# Patient Record
Sex: MALE | Employment: OTHER | URBAN - METROPOLITAN AREA
[De-identification: names, ages, dates, MRNs, and addresses within clinical notes are randomized per-mention and may not be internally consistent; named-entity substitution may affect disease eponyms.]

---

## 2020-06-30 ENCOUNTER — OFFICE VISIT (OUTPATIENT)
Dept: PRIMARY CARE CLINIC | Age: 70
End: 2020-06-30

## 2020-06-30 VITALS — TEMPERATURE: 98.4 F | OXYGEN SATURATION: 98 % | HEART RATE: 84 BPM

## 2020-06-30 DIAGNOSIS — Z11.59 SPECIAL SCREENING EXAMINATION FOR UNSPECIFIED VIRAL DISEASE: ICD-10-CM

## 2020-06-30 DIAGNOSIS — Z01.818 PRE-OP TESTING: Primary | ICD-10-CM

## 2020-06-30 NOTE — PROGRESS NOTES
Patient is being seen at the [facility]. Please see scanned documentation as well for further information. S:  Mr. Eryn Smith presents for pre-op or pre-procedure testing for Covid 19. Patient has procedure or surgery by Dr. Leonel Leon scheduled for 7/6/20. Patient denies current Covid type symptoms. O:    Visit Vitals  Pulse 84   Temp 98.4 °F (36.9 °C)   SpO2 98%     Alert and oriented  No acute distress, no increased work of breathing  Normocephalic, atraumatic  Skin color normal  Calm and cooperative  Voice clear, conversant without shortness of breath    A/P:  Pre-op, Pre-procedure exam    Covid 19 testing performed  Patient understands he will be contacted if the results are positive. Follow up prn.

## 2020-07-05 LAB — SARS-COV-2, NAA: NOT DETECTED

## 2020-07-06 ENCOUNTER — ANESTHESIA EVENT (OUTPATIENT)
Dept: ENDOSCOPY | Age: 70
End: 2020-07-06
Payer: MEDICARE

## 2020-07-06 ENCOUNTER — HOSPITAL ENCOUNTER (OUTPATIENT)
Age: 70
Setting detail: OUTPATIENT SURGERY
Discharge: HOME | End: 2020-07-06
Attending: SPECIALIST | Admitting: SPECIALIST
Payer: MEDICARE

## 2020-07-06 ENCOUNTER — ANESTHESIA (OUTPATIENT)
Dept: ENDOSCOPY | Age: 70
End: 2020-07-06
Payer: MEDICARE

## 2020-07-06 VITALS
WEIGHT: 148 LBS | SYSTOLIC BLOOD PRESSURE: 122 MMHG | HEART RATE: 59 BPM | DIASTOLIC BLOOD PRESSURE: 67 MMHG | BODY MASS INDEX: 22.43 KG/M2 | RESPIRATION RATE: 14 BRPM | OXYGEN SATURATION: 99 % | HEIGHT: 68 IN | TEMPERATURE: 97.3 F

## 2020-07-06 PROCEDURE — 74011250637 HC RX REV CODE- 250/637: Performed by: SPECIALIST

## 2020-07-06 PROCEDURE — 88305 TISSUE EXAM BY PATHOLOGIST: CPT

## 2020-07-06 PROCEDURE — 74011250636 HC RX REV CODE- 250/636: Performed by: NURSE ANESTHETIST, CERTIFIED REGISTERED

## 2020-07-06 PROCEDURE — 76060000031 HC ANESTHESIA FIRST 0.5 HR: Performed by: SPECIALIST

## 2020-07-06 PROCEDURE — 77030013992 HC SNR POLYP ENDOSC BSC -B: Performed by: SPECIALIST

## 2020-07-06 PROCEDURE — 76040000019: Performed by: SPECIALIST

## 2020-07-06 PROCEDURE — 74011000250 HC RX REV CODE- 250: Performed by: NURSE ANESTHETIST, CERTIFIED REGISTERED

## 2020-07-06 RX ORDER — SODIUM CHLORIDE 9 MG/ML
50 INJECTION, SOLUTION INTRAVENOUS CONTINUOUS
Status: DISCONTINUED | OUTPATIENT
Start: 2020-07-06 | End: 2020-07-06 | Stop reason: HOSPADM

## 2020-07-06 RX ORDER — FLUMAZENIL 0.1 MG/ML
0.2 INJECTION INTRAVENOUS
Status: DISCONTINUED | OUTPATIENT
Start: 2020-07-06 | End: 2020-07-06 | Stop reason: HOSPADM

## 2020-07-06 RX ORDER — SODIUM CHLORIDE 0.9 % (FLUSH) 0.9 %
5-40 SYRINGE (ML) INJECTION EVERY 8 HOURS
Status: DISCONTINUED | OUTPATIENT
Start: 2020-07-06 | End: 2020-07-06 | Stop reason: HOSPADM

## 2020-07-06 RX ORDER — GLYCOPYRROLATE 0.2 MG/ML
INJECTION INTRAMUSCULAR; INTRAVENOUS AS NEEDED
Status: DISCONTINUED | OUTPATIENT
Start: 2020-07-06 | End: 2020-07-06 | Stop reason: HOSPADM

## 2020-07-06 RX ORDER — ATROPINE SULFATE 0.1 MG/ML
0.5 INJECTION INTRAVENOUS
Status: DISCONTINUED | OUTPATIENT
Start: 2020-07-06 | End: 2020-07-06 | Stop reason: HOSPADM

## 2020-07-06 RX ORDER — EPINEPHRINE 0.1 MG/ML
1 INJECTION INTRACARDIAC; INTRAVENOUS
Status: DISCONTINUED | OUTPATIENT
Start: 2020-07-06 | End: 2020-07-06 | Stop reason: HOSPADM

## 2020-07-06 RX ORDER — FENTANYL CITRATE 50 UG/ML
12.5-5 INJECTION, SOLUTION INTRAMUSCULAR; INTRAVENOUS
Status: DISCONTINUED | OUTPATIENT
Start: 2020-07-06 | End: 2020-07-06 | Stop reason: HOSPADM

## 2020-07-06 RX ORDER — SODIUM CHLORIDE 0.9 % (FLUSH) 0.9 %
5-40 SYRINGE (ML) INJECTION AS NEEDED
Status: DISCONTINUED | OUTPATIENT
Start: 2020-07-06 | End: 2020-07-06 | Stop reason: HOSPADM

## 2020-07-06 RX ORDER — NALOXONE HYDROCHLORIDE 0.4 MG/ML
0.4 INJECTION, SOLUTION INTRAMUSCULAR; INTRAVENOUS; SUBCUTANEOUS
Status: DISCONTINUED | OUTPATIENT
Start: 2020-07-06 | End: 2020-07-06 | Stop reason: HOSPADM

## 2020-07-06 RX ORDER — AMPICILLIN TRIHYDRATE 250 MG
600 CAPSULE ORAL 2 TIMES DAILY
COMMUNITY

## 2020-07-06 RX ORDER — PROPOFOL 10 MG/ML
INJECTION, EMULSION INTRAVENOUS AS NEEDED
Status: DISCONTINUED | OUTPATIENT
Start: 2020-07-06 | End: 2020-07-06 | Stop reason: HOSPADM

## 2020-07-06 RX ORDER — DEXTROMETHORPHAN/PSEUDOEPHED 2.5-7.5/.8
1.2 DROPS ORAL
Status: DISCONTINUED | OUTPATIENT
Start: 2020-07-06 | End: 2020-07-06 | Stop reason: HOSPADM

## 2020-07-06 RX ORDER — MIDAZOLAM HYDROCHLORIDE 1 MG/ML
.25-5 INJECTION, SOLUTION INTRAMUSCULAR; INTRAVENOUS
Status: DISCONTINUED | OUTPATIENT
Start: 2020-07-06 | End: 2020-07-06 | Stop reason: HOSPADM

## 2020-07-06 RX ORDER — LIDOCAINE HYDROCHLORIDE 20 MG/ML
INJECTION, SOLUTION EPIDURAL; INFILTRATION; INTRACAUDAL; PERINEURAL AS NEEDED
Status: DISCONTINUED | OUTPATIENT
Start: 2020-07-06 | End: 2020-07-06 | Stop reason: HOSPADM

## 2020-07-06 RX ORDER — MULTIVITAMIN
1000 TABLET ORAL DAILY
COMMUNITY

## 2020-07-06 RX ORDER — SODIUM CHLORIDE 9 MG/ML
INJECTION, SOLUTION INTRAVENOUS
Status: DISCONTINUED | OUTPATIENT
Start: 2020-07-06 | End: 2020-07-06 | Stop reason: HOSPADM

## 2020-07-06 RX ADMIN — PROPOFOL 25 MG: 10 INJECTION, EMULSION INTRAVENOUS at 09:33

## 2020-07-06 RX ADMIN — PROPOFOL 25 MG: 10 INJECTION, EMULSION INTRAVENOUS at 09:38

## 2020-07-06 RX ADMIN — PROPOFOL 25 MG: 10 INJECTION, EMULSION INTRAVENOUS at 09:41

## 2020-07-06 RX ADMIN — LIDOCAINE HYDROCHLORIDE 40 MG: 20 INJECTION, SOLUTION EPIDURAL; INFILTRATION; INTRACAUDAL; PERINEURAL at 09:29

## 2020-07-06 RX ADMIN — PROPOFOL 25 MG: 10 INJECTION, EMULSION INTRAVENOUS at 09:36

## 2020-07-06 RX ADMIN — PROPOFOL 25 MG: 10 INJECTION, EMULSION INTRAVENOUS at 09:43

## 2020-07-06 RX ADMIN — GLYCOPYRROLATE 0.2 MG: 0.2 INJECTION, SOLUTION INTRAMUSCULAR; INTRAVENOUS at 09:35

## 2020-07-06 RX ADMIN — PROPOFOL 25 MG: 10 INJECTION, EMULSION INTRAVENOUS at 09:52

## 2020-07-06 RX ADMIN — PROPOFOL 25 MG: 10 INJECTION, EMULSION INTRAVENOUS at 09:46

## 2020-07-06 RX ADMIN — PROPOFOL 25 MG: 10 INJECTION, EMULSION INTRAVENOUS at 09:49

## 2020-07-06 RX ADMIN — PROPOFOL 75 MG: 10 INJECTION, EMULSION INTRAVENOUS at 09:29

## 2020-07-06 RX ADMIN — PROPOFOL 25 MG: 10 INJECTION, EMULSION INTRAVENOUS at 09:31

## 2020-07-06 RX ADMIN — SODIUM CHLORIDE: 900 INJECTION, SOLUTION INTRAVENOUS at 08:55

## 2020-07-06 NOTE — PERIOP NOTES

## 2020-07-06 NOTE — DISCHARGE INSTRUCTIONS
Quilla Galeazzi Coppinger  [id number]  1950    COLON DISCHARGE INSTRUCTIONS  Discomfort:  Redness at IV site- apply warm compress to area; if redness or soreness persist- contact your physician  There may be a slight amount of blood passed from the rectum  Gaseous discomfort- walking, belching will help relieve any discomfort  You may not operate a vehicle for 12 hours  You may not engage in an occupation involving machinery or appliances for rest of today  You may not drink alcoholic beverages for at least 12 hours  Avoid making any critical decisions for at least 24 hour  DIET:   Regular diet. - however -  remember your colon is empty and a heavy meal will produce gas. Avoid these foods:  vegetables, fried / greasy foods, carbonated drinks for today. ACTIVITY:  You may resume your normal daily activities it is recommended that you spend the remainder of the day resting -  avoid any strenuous activity. CALL M.D. ANY SIGN OF:  Increasing pain, nausea, vomiting  Abdominal distension (swelling)  New increased bleeding (oral or rectal)  Fever (chills)  Pain in chest area  Bloody discharge from nose or mouth  Shortness of breath    You may not  take any Advil, Aspirin, Ibuprofen, Motrin, Aleve, Goodys, or any similar pain or arthritis products for 5 days, ONLY  Tylenol as needed for pain. Follow-up Instructions:   Call Dr. Willam Watson  Results of procedure / biopsy in 10-14days   Office telephone for problems or questions [phone]      - Await pathology. - Repeat colonoscopy in 5 years.      - If < 10 years, reason: above average risk patient

## 2020-07-06 NOTE — H&P
Pre-endoscopy H and P for Colonoscopy    The patient was seen and examined. Date of last colonoscopy: 2012, Polyps  Yes      The airway was assessed and documented. The problem list, past medical history, and medications were reviewed. There is no problem list on file for this patient. Social History     Socioeconomic History    Marital status:      Spouse name: Not on file    Number of children: Not on file    Years of education: Not on file    Highest education level: Not on file   Occupational History    Not on file   Social Needs    Financial resource strain: Not on file    Food insecurity     Worry: Not on file     Inability: Not on file    Transportation needs     Medical: Not on file     Non-medical: Not on file   Tobacco Use    Smoking status: Not on file   Substance and Sexual Activity    Alcohol use: Not on file    Drug use: Not on file    Sexual activity: Not on file   Lifestyle    Physical activity     Days per week: Not on file     Minutes per session: Not on file    Stress: Not on file   Relationships    Social connections     Talks on phone: Not on file     Gets together: Not on file     Attends [employer] service: Not on file     Active member of club or organization: Not on file     Attends meetings of clubs or organizations: Not on file     Relationship status: Not on file    Intimate partner violence     Fear of current or ex partner: Not on file     Emotionally abused: Not on file     Physically abused: Not on file     Forced sexual activity: Not on file   Other Topics Concern    Not on file   Social History Narrative    Not on file     No past medical history on file. The patient has a family history of colon cancer    Prior to Admission Medications   Prescriptions Last Dose Informant Patient Reported? Taking?   cinnamon bark (Cinnamon) 500 mg cap   Yes Yes   Sig: Take 1,000 mg by mouth daily.    multivitamin, tx-iron-ca-min (THERA-M w/ IRON) 9 mg iron-400 mcg tab tablet 6/29/2020 at Unknown time  Yes Yes   Sig: Take 1 Tab by mouth daily. red yeast rice extract 600 mg cap 6/29/2020 at Unknown time  Yes Yes   Sig: Take 600 mg by mouth two (2) times a day. Facility-Administered Medications: None         The review of systems is:  negative for shortness of breath or chest pain      The heart, lungs and mental status were satisfactory for the administration of MAC sedation and for the procedure. Mallampati score: See Anesthesia. I discussed with the patient the objectives, risks, consequences and alternatives to the procedure. Plan: Endoscopic procedure with MAC sedation.     Vanderbilt Kayser, MD  7/6/2020  9:14 AM

## 2020-07-06 NOTE — PROCEDURES
Colonoscopy Procedure Note    Indications:   Family history of coloretal cancer (screening only), Personal history of colon polyps (screening only)  Referring Physician: Naila Guaman MD   Anesthesia/Sedation:MAC  Endoscopist:  Dr. Brenda Mesa  Assistant:  Endoscopy RN-1: Daniela Baptiste RN  Endoscopy RN-2: Shaun Saleh RN  Surgical Assistant: None  Implants: None    Preoperative diagnosis: FAMILY HISTORY OF MALIGNANT NEOPLASM OF OTHER ORGANS OR SYSTEMS, PERSONAL HISTORY OF COLONIC POLYPS    Postoperative diagnosis: ileocecal valve polyp      Procedure in Detail:  Informed consent was obtained for the procedure, including sedation. Risks of perforation, hemorrhage, adverse drug reaction, and aspiration were discussed. The patient was placed in the left lateral decubitus position. Based on the pre-procedure assessment, including review of the patient's medical history, medications, allergies, and review of systems, he had been deemed to be an appropriate candidate for moderate sedation; he was therefore sedated with the medications listed above. The patient was monitored continuously with ECG tracing, pulse oximetry, blood pressure monitoring, and direct observations. A rectal examination was performed. The [id number] was inserted into the rectum and advanced under direct vision to the terminal ileum. The quality of the colonic preparation was excellent. A careful inspection was made as the colonoscope was withdrawn, including a retroflexed view of the rectum; findings and interventions are described below. Findings:   Rectum: normal  Sigmoid: normal  Descending Colon: normal  Transverse Colon: normal  Ascending Colon: normal  Cecum: normal  Terminal Ileum: 4 mm polyp on ileal aspect of proximal lip of ICV removed with cold snare    Specimens:     see above    EBL: None    Complications: None; patient tolerated the procedure well. Recommendations:     - Await pathology. - Repeat colonoscopy in 5 years.      - If < 10 years, reason: above average risk patient    Signed By: Leidy Lombardo MD                        July 6, 2020

## 2020-07-06 NOTE — ANESTHESIA POSTPROCEDURE EVALUATION
Procedure(s):  COLONOSCOPY two charts :-  ENDOSCOPIC POLYPECTOMY. MAC    Anesthesia Post Evaluation      Multimodal analgesia: multimodal analgesia used between 6 hours prior to anesthesia start to PACU discharge  Patient location during evaluation: bedside  Patient participation: waiting for patient participation  Level of consciousness: awake  Pain management: adequate  Airway patency: patent  Anesthetic complications: no  Cardiovascular status: acceptable  Respiratory status: unassisted  Hydration status: acceptable  Comments: Post-Anesthesia Evaluation and Assessment    I have evaluated the patient and they are ready for PACU discharge. Patient: Isaac Narayan MRN: [id number]  SSN: [id number]   YOB: 1950  Age: 77 y.o. Sex: male      Cardiovascular Function/Vital Signs  /67   Pulse (!) 59   Temp 36.3 °C (97.3 °F)   Resp 14   Ht 5' 8\" (1.727 m)   Wt 67.1 kg (148 lb)   SpO2 99%   BMI 22.50 kg/m²     Patient is status post MAC anesthesia for Procedure(s):  COLONOSCOPY two charts :-  ENDOSCOPIC POLYPECTOMY. Nausea/Vomiting: None    Postoperative hydration reviewed and adequate. Pain:  Pain Scale 1: Numeric (0 - 10) (07/06/20 1022)  Pain Intensity 1: 0 (07/06/20 1022)   Managed    Neurological Status: At baseline    Mental Status, Level of Consciousness: Alert and  oriented to person, place, and time    Pulmonary Status:   O2 Device: Room air (07/06/20 1022)   Adequate oxygenation and airway patent    Complications related to anesthesia: None    Post-anesthesia assessment completed. No concerns    Signed By: Aurora Campoverde MD    July 6, 2020                   INITIAL Post-op Vital signs:   Vitals Value Taken Time   BP 0/0 7/6/2020 10:31 AM   Temp 36.3 °C (97.3 °F) 7/6/2020 10:12 AM   Pulse 0 7/6/2020 10:31 AM   Resp 0 7/6/2020 10:33 AM   SpO2 0 % 7/6/2020 10:33 AM   Vitals shown include unvalidated device data.

## 2023-05-11 RX ORDER — CRANBERRY FRUIT EXTRACT 200 MG
CAPSULE ORAL 2 TIMES DAILY
COMMUNITY

## 2023-05-11 RX ORDER — AMPICILLIN TRIHYDRATE 250 MG
1000 CAPSULE ORAL DAILY
COMMUNITY

## 2025-07-14 ENCOUNTER — ANESTHESIA EVENT (OUTPATIENT)
Facility: HOSPITAL | Age: 75
End: 2025-07-14
Payer: MEDICARE

## 2025-07-14 ENCOUNTER — ANESTHESIA (OUTPATIENT)
Facility: HOSPITAL | Age: 75
End: 2025-07-14
Payer: MEDICARE

## 2025-07-14 ENCOUNTER — HOSPITAL ENCOUNTER (OUTPATIENT)
Facility: HOSPITAL | Age: 75
Setting detail: OUTPATIENT SURGERY
Discharge: HOME | End: 2025-07-14
Attending: INTERNAL MEDICINE | Admitting: INTERNAL MEDICINE
Payer: MEDICARE

## 2025-07-14 VITALS
HEIGHT: 68 IN | HEART RATE: 51 BPM | OXYGEN SATURATION: 100 % | BODY MASS INDEX: 20.61 KG/M2 | RESPIRATION RATE: 13 BRPM | SYSTOLIC BLOOD PRESSURE: 114 MMHG | DIASTOLIC BLOOD PRESSURE: 72 MMHG | WEIGHT: 136 LBS | TEMPERATURE: 97.6 F

## 2025-07-14 PROCEDURE — 7100000010 HC PHASE II RECOVERY - FIRST 15 MIN: Performed by: INTERNAL MEDICINE

## 2025-07-14 PROCEDURE — 2709999900 HC NON-CHARGEABLE SUPPLY: Performed by: INTERNAL MEDICINE

## 2025-07-14 PROCEDURE — 7100000011 HC PHASE II RECOVERY - ADDTL 15 MIN: Performed by: INTERNAL MEDICINE

## 2025-07-14 PROCEDURE — 3600007502: Performed by: INTERNAL MEDICINE

## 2025-07-14 PROCEDURE — 3600007512: Performed by: INTERNAL MEDICINE

## 2025-07-14 PROCEDURE — 6360000002 HC RX W HCPCS: Performed by: NURSE ANESTHETIST, CERTIFIED REGISTERED

## 2025-07-14 PROCEDURE — 2580000003 HC RX 258: Performed by: NURSE ANESTHETIST, CERTIFIED REGISTERED

## 2025-07-14 PROCEDURE — 3700000000 HC ANESTHESIA ATTENDED CARE: Performed by: INTERNAL MEDICINE

## 2025-07-14 PROCEDURE — 88305 TISSUE EXAM BY PATHOLOGIST: CPT

## 2025-07-14 PROCEDURE — 3700000001 HC ADD 15 MINUTES (ANESTHESIA): Performed by: INTERNAL MEDICINE

## 2025-07-14 RX ORDER — 0.9 % SODIUM CHLORIDE 0.9 %
INTRAVENOUS SOLUTION INTRAVENOUS
Status: DISCONTINUED | OUTPATIENT
Start: 2025-07-14 | End: 2025-07-14 | Stop reason: SDUPTHER

## 2025-07-14 RX ORDER — SODIUM CHLORIDE 0.9 % (FLUSH) 0.9 %
5-40 SYRINGE (ML) INJECTION PRN
Status: DISCONTINUED | OUTPATIENT
Start: 2025-07-14 | End: 2025-07-14 | Stop reason: HOSPADM

## 2025-07-14 RX ORDER — ROSUVASTATIN CALCIUM 10 MG/1
10 TABLET, COATED ORAL DAILY
COMMUNITY

## 2025-07-14 RX ORDER — SODIUM CHLORIDE 9 MG/ML
INJECTION, SOLUTION INTRAVENOUS CONTINUOUS
Status: DISCONTINUED | OUTPATIENT
Start: 2025-07-14 | End: 2025-07-14 | Stop reason: HOSPADM

## 2025-07-14 RX ORDER — SODIUM CHLORIDE 9 MG/ML
INJECTION, SOLUTION INTRAVENOUS PRN
Status: DISCONTINUED | OUTPATIENT
Start: 2025-07-14 | End: 2025-07-14 | Stop reason: HOSPADM

## 2025-07-14 RX ORDER — SODIUM CHLORIDE 0.9 % (FLUSH) 0.9 %
5-40 SYRINGE (ML) INJECTION EVERY 12 HOURS SCHEDULED
Status: DISCONTINUED | OUTPATIENT
Start: 2025-07-14 | End: 2025-07-14 | Stop reason: HOSPADM

## 2025-07-14 RX ORDER — LIDOCAINE HYDROCHLORIDE 20 MG/ML
INJECTION, SOLUTION EPIDURAL; INFILTRATION; INTRACAUDAL; PERINEURAL
Status: DISCONTINUED | OUTPATIENT
Start: 2025-07-14 | End: 2025-07-14 | Stop reason: SDUPTHER

## 2025-07-14 RX ADMIN — LIDOCAINE HYDROCHLORIDE 50 MG: 20 INJECTION, SOLUTION EPIDURAL; INFILTRATION; INTRACAUDAL; PERINEURAL at 10:58

## 2025-07-14 RX ADMIN — PROPOFOL 20 MG: 10 INJECTION, EMULSION INTRAVENOUS at 10:05

## 2025-07-14 RX ADMIN — PROPOFOL 20 MG: 10 INJECTION, EMULSION INTRAVENOUS at 11:00

## 2025-07-14 RX ADMIN — PROPOFOL 20 MG: 10 INJECTION, EMULSION INTRAVENOUS at 11:05

## 2025-07-14 RX ADMIN — SODIUM CHLORIDE 350 ML: 9 INJECTION, SOLUTION INTRAVENOUS at 11:11

## 2025-07-14 RX ADMIN — PROPOFOL 80 MG: 10 INJECTION, EMULSION INTRAVENOUS at 10:58

## 2025-07-14 RX ADMIN — PROPOFOL 20 MG: 10 INJECTION, EMULSION INTRAVENOUS at 11:02

## 2025-07-14 ASSESSMENT — PAIN - FUNCTIONAL ASSESSMENT: PAIN_FUNCTIONAL_ASSESSMENT: NONE - DENIES PAIN

## 2025-07-14 NOTE — PROGRESS NOTES
Initial RN admission and assessment performed and documented in Endoscopy navigator.     Patient evaluated by anesthesia in pre-procedure holding.     All procedural vital signs, airway assessment, and level of consciousness information monitored and recorded by anesthesia staff on the anesthesia record.     Report received from CRNA post procedure.  Patient transported to recovery area by RN.    Endoscopy post procedure time out was performed and specimens were verified with physician.    Endoscope was pre-cleaned at bedside immediately following procedure by Ivan

## 2025-07-14 NOTE — ANESTHESIA PRE PROCEDURE
Department of Anesthesiology  Preprocedure Note       Name:  Hong Ray   Age:  74 y.o.  :  1950                                          MRN:  [id number]         Date:  2025      Surgeon: Surgeon(s):  Tanvi Borja MD    Procedure: Procedure(s):  COLONOSCOPY    Medications prior to admission:   Prior to Admission medications    Medication Sig Start Date End Date Taking? Authorizing Provider   rosuvastatin (CRESTOR) 10 MG tablet Take 1 tablet by mouth daily   Yes ProviderTorres MD   Ferrous Sulfate (IRON PO) Take 1 tablet by mouth daily   Yes Provider, MD Torres   Cinnamon 500 MG CAPS Take 2 capsules by mouth daily    Automatic Reconciliation, Ar   Red Yeast Rice Extract 600 MG CAPS Take by mouth 2 times daily    Automatic Reconciliation, Ar       Current medications:    Current Facility-Administered Medications   Medication Dose Route Frequency Provider Last Rate Last Admin   • 0.9 % sodium chloride infusion   IntraVENous Continuous Tanvi Borja MD       • sodium chloride flush 0.9 % injection 5-40 mL  5-40 mL IntraVENous 2 times per day Tanvi Borja MD       • sodium chloride flush 0.9 % injection 5-40 mL  5-40 mL IntraVENous PRN Tanvi Borja MD       • 0.9 % sodium chloride infusion   IntraVENous PRN Tanvi Borja MD           Allergies:  No Known Allergies    Problem List:  There is no problem list on file for this patient.      Past Medical History:        Diagnosis Date   • Hyperlipidemia        Past Surgical History:        Procedure Laterality Date   • COLONOSCOPY N/A 2020    COLONOSCOPY two charts :- performed by Clement Elias MD at [facility] ENDOSCOPY   • ORTHOPEDIC SURGERY       - back surgery-ruptured disc   • TONSILLECTOMY         Social History:    Social History     Tobacco Use   • Smoking status: Never   • Smokeless tobacco: Never   Substance Use Topics   • Alcohol use: Not Currently

## 2025-07-14 NOTE — INTERVAL H&P NOTE
Pre-Endoscopy H&P Update  Chief complaint/HPI/ROS:  The indication for the procedure, the patient's history and the patient's current medications are reviewed prior to the procedure and that data is reported on the H&P to which this document is attached.  Any significant complaints with regard to organ systems will be noted, and if not mentioned then a review of systems is not contributory.  No past medical history on file.   Past Surgical History:   Procedure Laterality Date    COLONOSCOPY N/A 7/6/2020    COLONOSCOPY two charts :- performed by Clement Elias MD at [facility] ENDOSCOPY    ORTHOPEDIC SURGERY      1965/1994 - back surgery-ruptured disc     Social   Social History     Tobacco Use    Smoking status: Not on file    Smokeless tobacco: Not on file   Substance Use Topics    Alcohol use: Not on file      No family history on file.   Not on File   Prior to Admission Medications   Prescriptions Last Dose Informant Patient Reported? Taking?   Cinnamon 500 MG CAPS   Yes No   Sig: Take 2 capsules by mouth daily   Red Yeast Rice Extract 600 MG CAPS   Yes No   Sig: Take by mouth 2 times daily      Facility-Administered Medications: None       PHYSICAL EXAM:  The patient is examined immediately prior to the procedure.  There were no vitals filed for this visit.  Gen: Appears comfortable, no distress.  Pulm: comfortable respirations with no abnormal audible breath sounds  HEART: well perfused, no abnormal audible heart sounds  GI: abdomen flat.    PLAN:  Informed consent discussion held, patient afforded an opportunity to ask questions and all questions answered.  After being advised of the risks, benefits, and alternatives, the patient requested that we proceed and indicated so on a written consent form.      Will proceed with procedure as planned.  Tanvi Borja Jr, MD

## 2025-07-14 NOTE — OP NOTE
[facility]  [facility]  NORMAN Borja Jr, MD  [phone]      2025    Colonoscopy Procedure Note  Hong Ray  :  1950  [facility] Medical Record Number: [id number]    Indications:   Personal history of colon polyps, last colonoscopy   PCP:  Gloria Jang MD  Anesthesia/Sedation: See Anesthesia Record  Endoscopist:  Dr. NORMAN Borja Jr  Complications:  None  Estimated Blood Loss:  None    Surgical assistant: Emersonulator: Funmi Shelby RN  Endoscopy Technician: Derian Holcomb none unless otherwise specified.     Permit:  The indications, risks, benefits and alternatives were reviewed with the patient or their decision maker who was provided an opportunity to ask questions and all questions were answered.  The specific risks of colonoscopy with conscious sedation were reviewed, including but not limited to anesthetic complication, bleeding, adverse drug reaction, missed lesion, infection, IV site reactions, and intestinal perforation which would lead to the need for surgical repair.  Alternatives to colonoscopy including radiographic imaging, observation without testing, or laboratory testing were reviewed including the limitations of those alternatives.  After considering the options and having all their questions answered, the patient or their decision maker provided both verbal and written consent to proceed.        Procedure in Detail:  After obtaining informed consent, positioning of the patient in the left lateral decubitus position, and conduction of a pre-procedure pause or \"time out\" the endoscope was introduced into the anus and advanced to the cecum, which was identified by the ileocecal valve and appendiceal orifice.  The quality of the colonic preparation was good.  A careful inspection was made as the colonoscope was withdrawn, findings and interventions

## 2025-07-14 NOTE — DISCHARGE INSTRUCTIONS
[facility]  [facility]  NORMAN Mckinley Jr, MD  [phone]      July 14, 2025    Hong Ray  YOB: 1950    COLONOSCOPY DISCHARGE INSTRUCTIONS    If there is redness at IV site you should apply warm compress to area.  If redness or soreness persist contact Dr. Mckinley's office or your primary care doctor.    There may be a slight amount of blood passed from the rectum.  Gaseous discomfort may develop, but walking, belching will help relieve this.  You may not operate a vehicle for 12 hours  You may not operate machinery or dangerous appliances for rest of today  You may not drink alcoholic beverages for 12 hours  Avoid making any critical decisions for 24 hours    DIET:  You may resume your normal diet, but some patients find that heavy or large meals may lead to indigestion or vomiting.  I suggest a light meal as first food intake.    MEDICATIONS:  The use of some over-the-counter pain medication may lead to bleeding after colon biopsies or polyp removal.  Tylenol (also called acetaminophen) is safe to take even if you have had colonoscopy with polyp removal.  Based on the procedure you had today you may safely take aspirin or aspirin-like products for the next seven (7) days.  Remember that Tylenol (also called acetaminophen) is safe to take after colonoscopy even if you have had biopsies or polyps removed.    ACTIVITY:  You may resume your normal household activities, but it is recommended that you spend the remainder of the day resting -  avoid any strenuous activity.    CALL DR. MCKINLEY'S OFFICE IF:  Increasing pain, nausea, vomiting  Abdominal distension (swelling)  Significant new or increased bleeding (oral or rectal)  Fever/Chills  Chest pain/shortness of breath                       Additional instructions:   Impression:  Ascending colon polyp x 1, removed  Small internal

## 2025-07-14 NOTE — H&P
74 y.o. male presents for open access colonoscopy for surveillance given a personal history of colon polyps, family history of colon cancer.  Additional H&P data will be attached on the day of procedure.    Tanvi Borja Jr, MD

## 2025-07-14 NOTE — ANESTHESIA POSTPROCEDURE EVALUATION
Department of Anesthesiology  Postprocedure Note    Patient: Hong Ray  MRN: [id number]  YOB: 1950  Date of evaluation: 7/14/2025    Procedure Summary       Date: 07/14/25 Room / Location: [facility] 02 / [facility] ENDOSCOPY    Anesthesia Start: 1055 Anesthesia Stop: 1113    Procedure: COLONOSCOPY Diagnosis:       Hx of colonic polyps      Family history of malignant neoplasm of gastrointestinal tract      Family history of colonic polyps      (Hx of colonic polyps [Z86.0100])      (Family history of malignant neoplasm of gastrointestinal tract [Z80.0])      (Family history of colonic polyps [Z83.719])    Surgeons: Tanvi Borja MD Responsible Provider: Keaton Nielsen MD    Anesthesia Type: MAC ASA Status: 2            Anesthesia Type: MAC    Tamika Phase I: Tamika Score: 10    Tamika Phase II: Tamika Score: 10    Anesthesia Post Evaluation    Patient location during evaluation: bedside  Nausea & Vomiting: no nausea  Cardiovascular status: blood pressure returned to baseline  Respiratory status: acceptable  Hydration status: euvolemic    No notable events documented.

## (undated) DEVICE — SUPPLEMENT DIGESTIVE H2O SOL GI-EASE

## (undated) DEVICE — TUBING HYDR IRR --

## (undated) DEVICE — SNARE ENDOSCP L240CM LOOP W27MM SHTH DIA2.4MM WRK CHN 2.8MM

## (undated) DEVICE — SNARE SURG 2.4 MMX230 CM EXACTO CLD

## (undated) DEVICE — TRAP SURG QUAD PARABOLA SLOT DSGN SFTY SCRN TRAPEASE